# Patient Record
Sex: FEMALE | Race: WHITE | NOT HISPANIC OR LATINO | Employment: UNEMPLOYED | URBAN - METROPOLITAN AREA
[De-identification: names, ages, dates, MRNs, and addresses within clinical notes are randomized per-mention and may not be internally consistent; named-entity substitution may affect disease eponyms.]

---

## 2017-05-03 ENCOUNTER — HOSPITAL ENCOUNTER (INPATIENT)
Facility: HOSPITAL | Age: 27
LOS: 3 days | Discharge: HOME | End: 2017-05-06
Attending: OBSTETRICS & GYNECOLOGY | Admitting: OBSTETRICS & GYNECOLOGY
Payer: MEDICAID

## 2017-05-03 DIAGNOSIS — O09.30 ENCOUNTER FOR SUPERVISION OF HIGH RISK PREGNANCY WITH INSUFFICIENT PRENATAL CARE, ANTEPARTUM: Primary | ICD-10-CM

## 2017-05-03 DIAGNOSIS — O47.9 THREATENED LABOR AT TERM: ICD-10-CM

## 2017-05-03 PROBLEM — F19.10 HIGH RISK PREGNANCY DUE TO MATERNAL DRUG ABUSE, ANTEPARTUM: Status: ACTIVE | Noted: 2017-05-03

## 2017-05-03 PROBLEM — S72.309A FRACTURE OF SHAFT OF FEMUR: Status: ACTIVE | Noted: 2017-05-03

## 2017-05-03 PROBLEM — Z34.90 NORMAL PREGNANCY: Status: RESOLVED | Noted: 2017-03-21 | Resolved: 2017-05-03

## 2017-05-03 PROBLEM — O99.320 HIGH RISK PREGNANCY DUE TO MATERNAL DRUG ABUSE, ANTEPARTUM: Status: ACTIVE | Noted: 2017-05-03

## 2017-05-03 PROBLEM — N93.9 VAGINA BLEEDING: Status: ACTIVE | Noted: 2017-05-03

## 2017-05-03 PROBLEM — Z34.90 NORMAL PREGNANCY: Status: ACTIVE | Noted: 2017-03-21

## 2017-05-03 LAB
ABO + RH BLD: NORMAL
AMPHET+METHAMPHET UR QL: NORMAL
BARBITURATES UR QL SCN>200 NG/ML: NEGATIVE
BASOPHILS # BLD AUTO: 0.02 K/UL
BASOPHILS NFR BLD: 0.3 %
BENZODIAZ UR QL SCN>200 NG/ML: NEGATIVE
BLD GP AB SCN CELLS X3 SERPL QL: NORMAL
BZE UR QL SCN: NEGATIVE
CANNABINOIDS UR QL SCN: NORMAL
CREAT UR-MCNC: 292.4 MG/DL
DIFFERENTIAL METHOD: ABNORMAL
EOSINOPHIL # BLD AUTO: 0.2 K/UL
EOSINOPHIL NFR BLD: 2.7 %
ERYTHROCYTE [DISTWIDTH] IN BLOOD BY AUTOMATED COUNT: 15 %
HCT VFR BLD AUTO: 29.9 %
HGB BLD-MCNC: 9.7 G/DL
HIV1+2 IGG SERPL QL IA.RAPID: NEGATIVE
LYMPHOCYTES # BLD AUTO: 2.1 K/UL
LYMPHOCYTES NFR BLD: 30 %
MCH RBC QN AUTO: 25.5 PG
MCHC RBC AUTO-ENTMCNC: 32.4 %
MCV RBC AUTO: 79 FL
METHADONE UR QL SCN>300 NG/ML: NEGATIVE
MONOCYTES # BLD AUTO: 0.6 K/UL
MONOCYTES NFR BLD: 9 %
NEUTROPHILS # BLD AUTO: 4.2 K/UL
NEUTROPHILS NFR BLD: 58 %
OPIATES UR QL SCN: NEGATIVE
PCP UR QL SCN>25 NG/ML: NEGATIVE
PLATELET # BLD AUTO: 283 K/UL
PMV BLD AUTO: 10.2 FL
RBC # BLD AUTO: 3.8 M/UL
RPR SER QL: NORMAL
TOXICOLOGY INFORMATION: NORMAL
WBC # BLD AUTO: 7.14 K/UL

## 2017-05-03 PROCEDURE — 86900 BLOOD TYPING SEROLOGIC ABO: CPT

## 2017-05-03 PROCEDURE — 86762 RUBELLA ANTIBODY: CPT

## 2017-05-03 PROCEDURE — 86592 SYPHILIS TEST NON-TREP QUAL: CPT

## 2017-05-03 PROCEDURE — 80307 DRUG TEST PRSMV CHEM ANLYZR: CPT

## 2017-05-03 PROCEDURE — 36415 COLL VENOUS BLD VENIPUNCTURE: CPT

## 2017-05-03 PROCEDURE — 82570 ASSAY OF URINE CREATININE: CPT

## 2017-05-03 PROCEDURE — 86703 HIV-1/HIV-2 1 RESULT ANTBDY: CPT

## 2017-05-03 PROCEDURE — 85025 COMPLETE CBC W/AUTO DIFF WBC: CPT

## 2017-05-03 PROCEDURE — 80074 ACUTE HEPATITIS PANEL: CPT

## 2017-05-03 PROCEDURE — 25000003 PHARM REV CODE 250: Performed by: OBSTETRICS & GYNECOLOGY

## 2017-05-03 PROCEDURE — 63600175 PHARM REV CODE 636 W HCPCS: Performed by: OBSTETRICS & GYNECOLOGY

## 2017-05-03 PROCEDURE — 86901 BLOOD TYPING SEROLOGIC RH(D): CPT

## 2017-05-03 PROCEDURE — 11000001 HC ACUTE MED/SURG PRIVATE ROOM

## 2017-05-03 RX ORDER — ONDANSETRON 8 MG/1
8 TABLET, ORALLY DISINTEGRATING ORAL EVERY 8 HOURS PRN
Status: DISCONTINUED | OUTPATIENT
Start: 2017-05-03 | End: 2017-05-04

## 2017-05-03 RX ORDER — BUTORPHANOL TARTRATE 1 MG/ML
1 INJECTION INTRAMUSCULAR; INTRAVENOUS
Status: DISCONTINUED | OUTPATIENT
Start: 2017-05-04 | End: 2017-05-04

## 2017-05-03 RX ORDER — OXYTOCIN/RINGER'S LACTATE 20/1000 ML
333 PLASTIC BAG, INJECTION (ML) INTRAVENOUS
Status: DISCONTINUED | OUTPATIENT
Start: 2017-05-03 | End: 2017-05-04

## 2017-05-03 RX ORDER — SODIUM CHLORIDE, SODIUM LACTATE, POTASSIUM CHLORIDE, CALCIUM CHLORIDE 600; 310; 30; 20 MG/100ML; MG/100ML; MG/100ML; MG/100ML
INJECTION, SOLUTION INTRAVENOUS CONTINUOUS
Status: DISCONTINUED | OUTPATIENT
Start: 2017-05-03 | End: 2017-05-04

## 2017-05-03 RX ADMIN — DEXTROSE 5 MILLION UNITS: 50 INJECTION, SOLUTION INTRAVENOUS at 09:05

## 2017-05-03 RX ADMIN — SODIUM CHLORIDE, SODIUM LACTATE, POTASSIUM CHLORIDE, AND CALCIUM CHLORIDE 1000 ML: .6; .31; .03; .02 INJECTION, SOLUTION INTRAVENOUS at 09:05

## 2017-05-03 RX ADMIN — BUTORPHANOL TARTRATE 1 MG: 1 INJECTION, SOLUTION INTRAMUSCULAR; INTRAVENOUS at 10:05

## 2017-05-03 NOTE — IP AVS SNAPSHOT
[facility]  [id number] [place] [address] [address] [place] [address]           Patient Discharge Instructions   Our goal is to set you up for success. This packet includes information on your condition, medications, and your home care.  It will help you care for yourself to prevent having to return to the hospital.     Please ask your nurse if you have any questions.      There are many details to remember when preparing to leave the hospital. Here is what you will need to do:    1. Take your medicine. If you are prescribed medications, review your Medication List on the following pages. You may have new medications to  at the pharmacy and others that you'll need to stop taking. Review the instructions for how and when to take your medications. Talk with your doctor or nurses if you are unsure of what to do.     2. Go to your follow-up appointments. Specific follow-up information is listed in the following pages. Your may be contacted by a nurse or clinical provider about future appointments. Be sure we have all of the phone numbers to reach you. Please contact your provider's office if you are unable to make an appointment.     3. Watch for warning signs. Your doctor or nurse will give you detailed warning signs to watch for and when to call for assistance. These instructions may also include educational information about your condition. If you experience any of warning signs to your health, call your doctor.           [facility]  Unless otherwise directed by your provider, please   contact [facility], our nurse care line   that is available for 24/7 assistance.     [phone] (toll-free)     Registered nurses in the [facility]   provide: appointment scheduling, clinical advisement, health education, and other advisory services.                  ** Verify the list of medication(s) below is accurate and up to date. Carry this with you in case of emergency. If your  "medications have changed, please notify your healthcare provider.             Medication List      CONTINUE taking these medications        Additional Info                      citalopram 20 MG tablet   Commonly known as:  CELEXA   Refills:  0   Dose:  20 mg    Instructions:  Take 20 mg by mouth once daily.     Begin Date    AM    Noon    PM    Bedtime       diclofenac 50 MG EC tablet   Commonly known as:  VOLTAREN   Quantity:  14 tablet   Refills:  0   Dose:  50 mg    Instructions:  Take 1 tablet (50 mg total) by mouth 3 (three) times daily as needed.     Begin Date    AM    Noon    PM    Bedtime         STOP taking these medications     naproxen 375 MG tablet   Commonly known as:  NAPROSYN                  Please bring to all follow up appointments:    1. A copy of your discharge instructions.  2. All medicines you are currently taking in their original bottles.  3. Identification and insurance card.    Please arrive 15 minutes ahead of scheduled appointment time.    Please call 24 hours in advance if you must reschedule your appointment and/or time.        Your Scheduled Appointments     Jun 07, 2017 10:15 AM CDT   Post Partum with FREIDA Amaya - OB/ GYN ([facility])    [id number] [place] [id number] [phone]              Follow-up Information     Follow up In 4 weeks.    Why:  PP visit        Discharge Instructions     Future Orders    Activity as tolerated     Diet general     Questions:    Total calories:      Fat restriction, if any:      Protein restriction, if any:      Na restriction, if any:      Fluid restriction:      Additional restrictions:          Discharge Instructions       Mother Self Care:    Activity: Avoid strenuous exercise and get adequate rest.  No driving until the physician consent given.  Emotional Changes: Most women find birth to be a time of great emotional upheaval.  Sense of loss, mood swings, fatigue, anxiety, and feeling "let " "down" are common.  If feelings worsen or last more than a week, call your physician.  Breast Care/Breastfeeding: Wear a bra for comfort.  Keep nipples dry and apply your own breast milk or lanolin cream as needed for soreness.  Engorgement can be relieved with warm, moist heat before feedings.  You may also take Ibuprofen.  Breast Care/Bottle Feeding: Wear support bra 24 hours a day for one week.  Avoid stimulation to breasts.  You may use ice packs for discomfort.  Juani-Care/Vaginal Bleeding: Remember to use your juani-bottle after urinating.  Your flow will change from red, to pink, to yellow/white color over a period of 2 weeks.  Menstruation will return in 3-8 weeks, or longer if breastfeeding.  Episiotomy Vaginal Delivery: Stitches will dissolve within 10 days to 3 weeks.  Warm baths, tucks, and dermoplast will promote healing.  Avoid bubble baths or strong soaps.   Section/Tubal Ligation: Keep incision clean and dry.  Please remove steri-strips in 5-7 days.  You may shower, but avoid baths.  Sexual Activity/Pelvic Rest: No sexual activity, tampons, or douching until your physician gives you consent.  Diet: Continue to eat from the five basic food groups, including plenty of protein, fruits, vegetables, and whole grains.  Limit empty calories and high fat foods.  Drink enough fluids to satisfy thirst and add an extra 500 calories for breastfeeding.  Constipation/Hemorrhoids: Drink plenty of water.  You may take a stool softener or natural laxative (Metamucil). You may use tucks or hemorrhoid ointment and soak in a warm tub.    CALL YOUR OB DOCTOR IF ANY OF THE FOLLOWING OCCURS:  *Heavy bleeding - saturating a pad an hour or passing any large (2-3 inches in size) blood clots.  *Any pain, redness, or tenderness in lower leg.  *You cannot care for yourself or your baby.  *Any signs of infection-      - Temperature greater than 100.5 degrees F      - Foul smelling vaginal discharge and/or incisional " "drainage      - Increased episiotomy or incisional pain      - Hot, hard, red or sore area on breast      - Flu-like symptoms      - Any urgency, frequency or burning with urination      Primary Diagnosis     Your primary diagnosis was:  Normal Vaginal Delivery      Admission Information     Date & Time Provider Department CSN    5/3/2017  7:15 PM Sandhya Yepez MD [facility] [id number]      Care Providers     Provider Role Specialty Primary office phone    Sandhya Yepez MD Attending Provider Obstetrics and Gynecology [phone]      Your Vitals Were     BP Pulse Temp Resp Height Weight    136/87 82 98.2 °F (36.8 °C) (Oral) 18 5' 5" (1.651 m) 69.9 kg (154 lb)    Last Period SpO2 BMI          (LMP Unknown) 100% 25.63 kg/m2        Recent Lab Values     No lab values to display.      Allergies as of 5/6/2017        Reactions    Flexeril [Cyclobenzaprine] Other (See Comments)    Muscle spasms  Muscular twitching    Reglan [Metoclopramide] Other (See Comments)    Muscular twitching    Metoclopramide Hcl Other (See Comments)    Muscle spasms      Advance Directives     An advance directive is a document which, in the event you are no longer able to make decisions for yourself, tells your healthcare team what kind of treatment you do or do not want to receive, or who you would like to make those decisions for you.  If you do not currently have an advance directive, [facility] encourages you to create one.  For more information call:  [phone] ([phone]), [phone] ([phone]),  or log on to [email or web address].        Smoking Cessation     If you would like to quit smoking:   You may be eligible for free services if you are a [place] resident and started smoking cigarettes before September 1, 1988.  Call the Smoking Cessation Trust (SCT) toll free at (566) 715-3996 or (924) 635-9807.   Call 6-800-QUIT-NOW if you do not meet the above criteria.   Contact us via email: " [email or web address]   View our website for more information: [email or web address]        Language Assistance Services     ATTENTION: Language assistance services are available, free of charge. Please call [phone].      ATENCIÓN: Si benito packer, tiene a leon disposición servicios gratuitos de asistencia lingüística. Llame al [phone].     CHÚ Ý: N?u b?n nói Ti?ng Vi?t, có các d?ch v? h? tr? ngôn ng? mi?n phí dành cho b?n. G?i s? [phone].        [facility] Sign-Up     Activating your [facility] account is as easy as 1-2-3!     1) Visit [email or web address], select Sign Up Now, enter this activation code and your date of birth, then select Next.  [email or web address]  Expires: 6/20/2017 12:44 PM      2) Create a username and password to use when you visit [facility] in the future and select a security question in case you lose your password and select Next.    3) Enter your e-mail address and click Sign Up!    Additional Information  If you have questions, please e-mail [email or web address] or call [phone] to talk to our [facility] staff. Remember, [facility] is NOT to be used for urgent needs. For medical emergencies, dial 911.          [facility] complies with applicable Federal civil rights laws and does not discriminate on the basis of race, color, national origin, age, disability, or sex.

## 2017-05-04 ENCOUNTER — ANESTHESIA (OUTPATIENT)
Dept: OBSTETRICS AND GYNECOLOGY | Facility: HOSPITAL | Age: 27
End: 2017-05-04
Payer: MEDICAID

## 2017-05-04 ENCOUNTER — ANESTHESIA EVENT (OUTPATIENT)
Dept: OBSTETRICS AND GYNECOLOGY | Facility: HOSPITAL | Age: 27
End: 2017-05-04
Payer: MEDICAID

## 2017-05-04 PROCEDURE — 72100003 HC LABOR CARE, EA. ADDL. 8 HRS

## 2017-05-04 PROCEDURE — 63600175 PHARM REV CODE 636 W HCPCS: Performed by: OBSTETRICS & GYNECOLOGY

## 2017-05-04 PROCEDURE — 25000003 PHARM REV CODE 250: Performed by: NURSE ANESTHETIST, CERTIFIED REGISTERED

## 2017-05-04 PROCEDURE — 63600175 PHARM REV CODE 636 W HCPCS: Performed by: NURSE ANESTHETIST, CERTIFIED REGISTERED

## 2017-05-04 PROCEDURE — 25000003 PHARM REV CODE 250: Performed by: ADVANCED PRACTICE MIDWIFE

## 2017-05-04 PROCEDURE — 27800517 HC TRAY,EPIDURAL-CONTINUOUS: Performed by: NURSE ANESTHETIST, CERTIFIED REGISTERED

## 2017-05-04 PROCEDURE — 59409 OBSTETRICAL CARE: CPT | Mod: AT,,, | Performed by: ADVANCED PRACTICE MIDWIFE

## 2017-05-04 PROCEDURE — 63600175 PHARM REV CODE 636 W HCPCS: Performed by: ADVANCED PRACTICE MIDWIFE

## 2017-05-04 PROCEDURE — 72100002 HC LABOR CARE, 1ST 8 HOURS

## 2017-05-04 PROCEDURE — 51701 INSERT BLADDER CATHETER: CPT

## 2017-05-04 PROCEDURE — 59025 FETAL NON-STRESS TEST: CPT

## 2017-05-04 PROCEDURE — 72200005 HC VAGINAL DELIVERY LEVEL II

## 2017-05-04 PROCEDURE — 11000001 HC ACUTE MED/SURG PRIVATE ROOM

## 2017-05-04 PROCEDURE — 25000003 PHARM REV CODE 250: Performed by: OBSTETRICS & GYNECOLOGY

## 2017-05-04 PROCEDURE — 99211 OFF/OP EST MAY X REQ PHY/QHP: CPT | Mod: 25

## 2017-05-04 PROCEDURE — 62326 NJX INTERLAMINAR LMBR/SAC: CPT | Performed by: ANESTHESIOLOGY

## 2017-05-04 RX ORDER — AMMONIA 15 % (W/V)
0.3 AMPUL (EA) INHALATION CONTINUOUS PRN
Status: DISCONTINUED | OUTPATIENT
Start: 2017-05-04 | End: 2017-05-06 | Stop reason: HOSPADM

## 2017-05-04 RX ORDER — ACETAMINOPHEN 325 MG/1
650 TABLET ORAL EVERY 6 HOURS PRN
Status: DISCONTINUED | OUTPATIENT
Start: 2017-05-04 | End: 2017-05-06 | Stop reason: HOSPADM

## 2017-05-04 RX ORDER — HYDROCORTISONE 25 MG/G
CREAM TOPICAL 3 TIMES DAILY PRN
Status: DISCONTINUED | OUTPATIENT
Start: 2017-05-04 | End: 2017-05-06 | Stop reason: HOSPADM

## 2017-05-04 RX ORDER — OXYTOCIN/RINGER'S LACTATE 20/1000 ML
2 PLASTIC BAG, INJECTION (ML) INTRAVENOUS CONTINUOUS
Status: DISCONTINUED | OUTPATIENT
Start: 2017-05-04 | End: 2017-05-04

## 2017-05-04 RX ORDER — DOCUSATE SODIUM 100 MG/1
100 CAPSULE, LIQUID FILLED ORAL DAILY
Status: DISCONTINUED | OUTPATIENT
Start: 2017-05-04 | End: 2017-05-06 | Stop reason: HOSPADM

## 2017-05-04 RX ORDER — ROPIVACAINE HYDROCHLORIDE 2 MG/ML
INJECTION, SOLUTION EPIDURAL; INFILTRATION; PERINEURAL
Status: DISCONTINUED | OUTPATIENT
Start: 2017-05-04 | End: 2017-05-04

## 2017-05-04 RX ORDER — FENTANYL CITRATE 50 UG/ML
100 INJECTION, SOLUTION INTRAMUSCULAR; INTRAVENOUS ONCE
Status: COMPLETED | OUTPATIENT
Start: 2017-05-04 | End: 2017-05-04

## 2017-05-04 RX ORDER — LIDOCAINE HYDROCHLORIDE AND EPINEPHRINE 15; 5 MG/ML; UG/ML
INJECTION, SOLUTION EPIDURAL
Status: DISCONTINUED | OUTPATIENT
Start: 2017-05-04 | End: 2017-05-04

## 2017-05-04 RX ORDER — ROPIVACAINE HYDROCHLORIDE 2 MG/ML
INJECTION, SOLUTION EPIDURAL; INFILTRATION; PERINEURAL CONTINUOUS PRN
Status: DISCONTINUED | OUTPATIENT
Start: 2017-05-04 | End: 2017-05-04

## 2017-05-04 RX ORDER — OXYCODONE AND ACETAMINOPHEN 10; 325 MG/1; MG/1
1 TABLET ORAL EVERY 4 HOURS PRN
Status: DISCONTINUED | OUTPATIENT
Start: 2017-05-04 | End: 2017-05-06 | Stop reason: HOSPADM

## 2017-05-04 RX ORDER — OXYCODONE AND ACETAMINOPHEN 5; 325 MG/1; MG/1
1 TABLET ORAL EVERY 4 HOURS PRN
Status: DISCONTINUED | OUTPATIENT
Start: 2017-05-04 | End: 2017-05-06 | Stop reason: HOSPADM

## 2017-05-04 RX ORDER — DIPHENHYDRAMINE HCL 25 MG
25 CAPSULE ORAL EVERY 4 HOURS PRN
Status: DISCONTINUED | OUTPATIENT
Start: 2017-05-04 | End: 2017-05-06 | Stop reason: HOSPADM

## 2017-05-04 RX ORDER — IBUPROFEN 600 MG/1
600 TABLET ORAL EVERY 6 HOURS PRN
Status: DISCONTINUED | OUTPATIENT
Start: 2017-05-04 | End: 2017-05-06 | Stop reason: HOSPADM

## 2017-05-04 RX ORDER — ONDANSETRON 8 MG/1
8 TABLET, ORALLY DISINTEGRATING ORAL EVERY 8 HOURS PRN
Status: DISCONTINUED | OUTPATIENT
Start: 2017-05-04 | End: 2017-05-06 | Stop reason: HOSPADM

## 2017-05-04 RX ADMIN — IBUPROFEN 600 MG: 600 TABLET, FILM COATED ORAL at 11:05

## 2017-05-04 RX ADMIN — LIDOCAINE HYDROCHLORIDE AND EPINEPHRINE 3 ML: 15; 5 INJECTION, SOLUTION EPIDURAL; INFILTRATION; INTRACAUDAL; PERINEURAL at 07:05

## 2017-05-04 RX ADMIN — OXYCODONE HYDROCHLORIDE AND ACETAMINOPHEN 1 TABLET: 10; 325 TABLET ORAL at 07:05

## 2017-05-04 RX ADMIN — FENTANYL CITRATE 100 MCG: 50 INJECTION INTRAMUSCULAR; INTRAVENOUS at 06:05

## 2017-05-04 RX ADMIN — IBUPROFEN 600 MG: 600 TABLET, FILM COATED ORAL at 05:05

## 2017-05-04 RX ADMIN — ROPIVACAINE HYDROCHLORIDE 11 ML: 2 INJECTION, SOLUTION EPIDURAL; INFILTRATION at 07:05

## 2017-05-04 RX ADMIN — BUTORPHANOL TARTRATE 1 MG: 1 INJECTION, SOLUTION INTRAMUSCULAR; INTRAVENOUS at 03:05

## 2017-05-04 RX ADMIN — ROPIVACAINE HYDROCHLORIDE 12 ML/HR: 2 INJECTION, SOLUTION EPIDURAL; INFILTRATION at 07:05

## 2017-05-04 RX ADMIN — DEXTROSE 2.5 MILLION UNITS: 50 INJECTION, SOLUTION INTRAVENOUS at 01:05

## 2017-05-04 RX ADMIN — DEXTROSE 2.5 MILLION UNITS: 50 INJECTION, SOLUTION INTRAVENOUS at 05:05

## 2017-05-04 RX ADMIN — SODIUM CHLORIDE, SODIUM LACTATE, POTASSIUM CHLORIDE, AND CALCIUM CHLORIDE: .6; .31; .03; .02 INJECTION, SOLUTION INTRAVENOUS at 07:05

## 2017-05-04 RX ADMIN — SODIUM CHLORIDE, SODIUM LACTATE, POTASSIUM CHLORIDE, AND CALCIUM CHLORIDE: .6; .31; .03; .02 INJECTION, SOLUTION INTRAVENOUS at 01:05

## 2017-05-04 RX ADMIN — SODIUM CHLORIDE, SODIUM LACTATE, POTASSIUM CHLORIDE, AND CALCIUM CHLORIDE: .6; .31; .03; .02 INJECTION, SOLUTION INTRAVENOUS at 05:05

## 2017-05-04 RX ADMIN — Medication 2 MILLI-UNITS/MIN: at 01:05

## 2017-05-04 RX ADMIN — OXYCODONE HYDROCHLORIDE AND ACETAMINOPHEN 1 TABLET: 5; 325 TABLET ORAL at 03:05

## 2017-05-04 NOTE — ANESTHESIA RELEASE NOTE
"Anesthesia Release from PACU Note    Patient: Georgette Adwoa Jenaro    Procedure(s) Performed: epidural    Anesthesia type: epidural    Post pain: Adequate analgesia    Post assessment: no apparent anesthetic complications    Last Vitals:   Visit Vitals    /77    Pulse 60    Temp 36.7 °C (98.1 °F) (Oral)    Resp 18    Ht 5' 5" (1.651 m)    Wt 69.9 kg (154 lb)    LMP  (LMP Unknown)    SpO2 100%    Breastfeeding Unknown    BMI 25.63 kg/m2       Post vital signs: stable    Level of consciousness: awake and alert     Nausea/Vomiting: no nausea/no vomiting    Complications: none    Airway Patency: patent    Respiratory: unassisted, spontaneous ventilation, room air    Cardiovascular: stable and blood pressure at baseline    Hydration: euvolemic  "

## 2017-05-04 NOTE — ASSESSMENT & PLAN NOTE
No vaginal bleeding at present.  Monitor closely for onset of bleeding or s/s abruption, NRFHR.  Will notify MD immediately of same.
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
## 2017-05-04 NOTE — ANESTHESIA PREPROCEDURE EVALUATION
05/04/2017  Georgette Eason is a 26 y.o., female.    Anesthesia Evaluation    I have reviewed the Patient Summary Reports.    I have reviewed the Nursing Notes.   I have reviewed the Medications.     Review of Systems  Anesthesia Hx:  No problems with previous Anesthesia  History of prior surgery of interest to airway management or planning: Previous anesthesia: General, Epidural Denies Family Hx of Anesthesia complications.   Denies Personal Hx of Anesthesia complications.   Social:  Smoker thc and methampetamine use   Hematology/Oncology:  Hematology Normal   Oncology Normal     EENT/Dental:EENT/Dental Normal   Cardiovascular:  Cardiovascular Normal     Pulmonary:  Pulmonary Normal    Hepatic/GI:   Liver Disease, Recently diagnosed with hep c   Musculoskeletal:  Musculoskeletal Normal    Neurological:  Neurology Normal    Endocrine:  Endocrine Normal    Dermatological:  Skin Normal    Psych:   Psychiatric History          Physical Exam  General:  Well nourished    Airway/Jaw/Neck:  Airway Findings: Mouth Opening: Normal Tongue: Normal  General Airway Assessment: Adult  Mallampati: III  Improves to II with phonation.  TM Distance: Normal, at least 6 cm  Jaw/Neck Findings:  Neck ROM: Normal ROM      Dental:  Dental Findings: Periodontal disease, Mild, In tact        Mental Status:  Mental Status Findings:  Alert and Oriented         Anesthesia Plan  Type of Anesthesia, risks & benefits discussed:  Anesthesia Type:  epidural  Patient's Preference:   Intra-op Monitoring Plan:   Intra-op Monitoring Plan Comments:   Post Op Pain Control Plan:   Post Op Pain Control Plan Comments:   Induction:    Beta Blocker:  Patient is not currently on a Beta-Blocker (No further documentation required).       Informed Consent: Patient understands risks and agrees with Anesthesia plan.  Questions answered. Anesthesia  consent signed with patient.  ASA Score: 3     Day of Surgery Review of History & Physical: I have interviewed and examined the patient. I have reviewed the patient's H&P dated: 5/4/17. There are no significant changes.  H&P update referred to the provider.     Anesthesia Plan Notes: Fentanyl 100 mcg iv at 0630        Ready For Surgery From Anesthesia Perspective.

## 2017-05-04 NOTE — PROGRESS NOTES
[facility]  Obstetrics  Labor Progress Note    Patient Name: Georgette Eason  MRN: [id number]  Admission Date: 5/3/2017  Hospital Length of Stay: 1 days  Attending Physician: Sandhya Yepez MD  Primary Care Provider: Ramona Fermin MD    Subjective:     Principal Problem:<principal problem not specified>    Hospital Course:  Patient with no records on file.  Appears to be in discomfort and placed in triage for immediate assessment, VS, labs and evaluation.   Dr. Yepez called w/ results of FH of 39 cm, vtx presentation by Leopolds and VE, EFW 6 -7#,  VE 3.5 cm, Cat 2 tracing (no accels, but neg decels with contractions noted q 3-4), h/o substance use and blood clot visualized of 5 cm size (pt brought in).   Recommended admission and commit to delivery with initial expectant management, CFM and evaluation for cervical change or increased bleeding.   Routine ob with additional labs for no PNC, do UDS and hepatitis panel.       5/3/2017 - 9:15 pm. Records obtained from [facility].   Patient requested that her status be changed to organ donor.  States 's license states no organ donation, but has been wanting to change for 8 months and wants it known she would like to be an organ donor.     2017 - 1 am  Augmentation indicated.   17 8am AROM moderate meconium        Interval History:  Georgette Orona is a 26 y.o.  at 38.3 by 36 week sono. She is doing well. Adequate progress in labor Meconium stained fluid epidural placed for pain control    Objective:     Vital Signs (Most Recent):  Temp: 97.6 °F (36.4 °C) (17)  Pulse: 62 (17 06)  Resp: 20 (17)  BP: (!) 135/100 (17) Vital Signs (24h Range):  Temp:  [97.6 °F (36.4 °C)-98.1 °F (36.7 °C)] 97.6 °F (36.4 °C)  Pulse:  [] 62  Resp:  [18-20] 20  BP: (123-145)/() 135/100     Weight: 69.9 kg (154 lb)  Body mass index is 25.63 kg/(m^2).    FHT: 150Cat 1  (reassuring)  TOCO:  Q 60 minutes    Cervical Exam:  Dilation:  6  Effacement:  75%  Station: -2  Presentation: Vertex     Significant Labs:  Lab Results   Component Value Date    GROUPTRH O POS 2017       I have personallly reviewed all pertinent lab results from the last 24 hours.    Physical Exam:   Constitutional: She is oriented to person, place, and time. She appears well-developed and well-nourished.    HENT:   Head: Normocephalic.    Eyes: Conjunctivae are normal. Pupils are equal, round, and reactive to light.    Neck: Normal range of motion.    Cardiovascular: Normal rate and regular rhythm.     Pulmonary/Chest: Effort normal and breath sounds normal.        Abdominal: Soft.     Genitourinary: Vagina normal and uterus normal.           Musculoskeletal: Normal range of motion and moves all extremeties.       Neurological: She is alert and oriented to person, place, and time.    Skin: Skin is warm and dry.    Psychiatric: She has a normal mood and affect. Her behavior is normal. Thought content normal.       Assessment/Plan:     26 y.o. female  at Unknown for:    High risk pregnancy due to maternal drug abuse, antepartum  Epidural placed for comfort  AROM moderate meconium  CX6/90/-2  P continue to monitor and assess        June Cunningham CNM  Obstetrics  [facility]

## 2017-05-04 NOTE — TRANSFER OF CARE
"Anesthesia Transfer of Care Note    Patient: Georgette Adwoa Jenaro    Procedure(s) Performed: epidural    Patient location: Labor and Delivery    Anesthesia Type: epidural    Post pain: adequate analgesia    Post assessment: no apparent anesthetic complications    Post vital signs: stable    Level of consciousness: awake and alert    Nausea/Vomiting: no nausea/vomiting    Complications: none          Last vitals:   Visit Vitals    /77    Pulse 60    Temp 36.7 °C (98.1 °F) (Oral)    Resp 18    Ht 5' 5" (1.651 m)    Wt 69.9 kg (154 lb)    LMP  (LMP Unknown)    SpO2 100%    Breastfeeding Unknown    BMI 25.63 kg/m2     "

## 2017-05-04 NOTE — PLAN OF CARE
"Problem: Patient Care Overview  Goal: Plan of Care Review  Outcome: Ongoing (interventions implemented as appropriate)  Patient admitted for labor. Patient desires natural child birth. Patient had 2 PNC visits at [facility]. Patient admits to amphetamines and THC "a few days ago". Patient and baby tolerating labor well. Family at bedside with patient.       "

## 2017-05-04 NOTE — ANESTHESIA PROCEDURE NOTES
Epidural    Patient location during procedure: OB   Reason for block: primary anesthetic   Diagnosis: iup   Start time: 5/4/2017 7:12 AM  Timeout: 5/4/2017 7:11 AM  End time: 5/4/2017 7:34 AM  Surgery related to: labor  Staffing  Anesthesiologist: CUBA TIAN  Performed by: anesthesiologist   Preanesthetic Checklist  Completed: patient identified, surgical consent, pre-op evaluation, timeout performed, IV checked, risks and benefits discussed, monitors and equipment checked, anesthesia consent given, hand hygiene performed and patient being monitored  Preparation  Patient position: sitting  Prep: Betadine  Patient monitoring: Pulse Ox and Blood Pressure  Epidural  Skin Anesthetic: lidocaine 1%  Administration type: continuous  Approach: midline  Interspace: L3-4  Injection technique: ERIKA air  Needle and Epidural Catheter  Needle type: Tuohy   Needle gauge: 18  Needle length: 3.5 inches  Needle insertion depth: 7 cm  Catheter type: multi-orifice  Catheter size: 20 G  Catheter at skin depth: 13 cm  Test dose: 3 mL of lidocaine 1.5% with Epi 1-to-200,000  Additional Documentation: incremental injection, negative aspiration for heme and CSF, no signs/symptoms of IV or SA injection, no paresthesia on injection, no significant pain on injection and no significant complaints from patient  Needle localization: anatomical landmarks  Medications:  Bolus administered: 11 mL of 0.2% ropivacaine  Volume per aspiration: 4 mL  Time between aspirations: 0.3 minutes  Assessment  Upper dermatomal levels - Left: T10  Right: T10   Dermatomal levels determined by alcohol wipe  Ease of block: easy  Patient's tolerance of the procedure: no complaints and comfortable throughout block  Post dural Puncture Headache?: Yes  Additional Notes  Blood aspirated in catheter that did not clear with saline. Catheter removed and epidural repeated

## 2017-05-04 NOTE — L&D DELIVERY NOTE
Delivery Information for  Magen Eason    Birth information:  YOB: 2016   Time of birth: 9:13 AM   Sex: male   Head Delivery Date/Time: 2017  9:03 AM   Delivery type: Vaginal, Spontaneous Delivery   Gestational Age: <None>    Delivery Providers    Delivering clinician:  MALISSA COREA   Other personnel:   Provider Role   SHAVON CONDON Registered Nurse   GAYATRI LION Registered Nurse   JOSE HERNANDEZ Surgical Tech   ROSIBEL TOMPKINS Registered Nurse                  Dexter Measurements    Weight:  3210 g Length:  49.5 cm   Head circum.:  34 cm Chest circum.:  32 cm   Abdominal girth:  33 cm           Assessment    Living status:  Yes   Apgars    1 Minute:   5 Minute:   10 Minute 15 Minute 20 Minute   Skin Color: 0  1       Heart Rate: 1  2       Reflex Irritability: 1  2       Muscle Tone: 0  2       Respiratory Effort: 0  2       Total: 2  9                  Apgars Assigned By:  BALTA BROCK          Assisted Delivery Details:    Forceps attempted?:  No   Vacuum extractor attempted?:  No             Shoulder Dystocia    Shoulder dystocia present?:  No                                             Presentation and Position    Presentation: Vertex   Position:     Occiput    Anterior               Interventions/Resuscitation    Method:  Bulb Suctioning, Tactile Stimulation, Deep Suctioning, PPV, NICU Attended        Cord    Complications:  Nuchal   Nuchal Intervention:  clamped and cut   Nuchal Cord Description:  tight nuchal cord   Number of Loops:  1   Delayed Cord Clamping?:  No   Cord Clamped Date/Time:  2017  9:14 AM   Cord Blood Disposition:  Lab   Gases Sent?:  No   Stem Cell Collection (by MD):  No        Placenta    Date and time:  2017  9:18 AM   Removal:  Spontaneous   Appearance:  Intact   Placenta disposition:  Discarded            Labor Events:       labor: No     Labor Onset Date/Time: 2017 07:00     Dilation Complete Date/Time:  2017 08:53     Start Pushing Date/Time: 2017 09:06     Rupture Date/Time:              Rupture type:           Fluid Amount:        Fluid Color:        Fluid Odor:        Membrane Status (PeriCalm): ARM (Artificial Rupture)      Rupture Date/Time (PeriCalm): 2017 07:58:00      Fluid Amount (PeriCalm): Moderate      Fluid Color (PeriCalm): Meconium Moderate       steroids: None     Antibiotics given for GBS: Yes     Induction: none     Indications for induction:        Augmentation: oxytocin     Indications for augmentation: Ineffective Contraction Pattern     Labor complications: None     Additional complications:          Cervical ripening:                     Delivery:      Episiotomy: None     Indication for Episiotomy:       Perineal Lacerations: None Repaired:      Periurethral Laceration: none Repaired:     Labial Laceration: none Repaired:     Sulcus Laceration: none Repaired:     Vaginal Laceration: No Repaired:     Cervical Laceration: No Repaired:     Repair suture:       Repair # of packets:       Blood loss (ml): 300     Vaginal Sweep Performed:       Surgicount Correct:         Other providers:       Anesthesia    Method:  Epidural              Details (if applicable):  Trial of Labor      Categorization:      Priority:     Indications for :     Incision Type:       Additional  information:  Forceps:    Vacuum:    Breech:    Observed anomalies    Other (Comments):          of male infant in OA position over intact perineum  Infant had tight nucal cord, clamped and cut reduced on perineum  Shoulders delivered with ease  Infant to [facility] for meconium management  Placenta delivered with gentle traction  Inspection reveals no lacerations  EBL 300cc

## 2017-05-04 NOTE — ASSESSMENT & PLAN NOTE
Patient currently admitted to hospital for expectant management of early labor.   UDS pending.     UDS + amphetamines, THC.  Contractions ongoing but slightly irregular. Pitocin augmentation was started for same and currently @ 4mu/min.  FHT's reassuriing.  No evidence of abruption/tachysytole or vaginal bleeding.  Increase pitocin per guidelines.   May have Stadol IV as ordered for analgesia or epidural if desires.
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
## 2017-05-04 NOTE — PROGRESS NOTES
[facility]  Obstetrics  Labor Progress Note    Patient Name: Georgette Eason  MRN: [id number]  Admission Date: 5/3/2017  Hospital Length of Stay: 1 days  Attending Physician: Sandhya Yepez MD  Primary Care Provider: Ramona Fermin MD    Subjective:     Principal Problem:<principal problem not specified>    Hospital Course:  Patient with no records on file.  Appears to be in discomfort and placed in triage for immediate assessment, VS, labs and evaluation.   Dr. Yepez called w/ results of FH of 39 cm, vtx presentation by Leopolds and VE, EFW 6 -7#,  VE 3.5 cm, Cat 2 tracing (no accels, but neg decels with contractions noted q 3-4), h/o substance use and blood clot visualized of 5 cm size (pt brought in).   Recommended admission and commit to delivery with initial expectant management, CFM and evaluation for cervical change or increased bleeding.   Routine ob with additional labs for no PNC, do UDS and hepatitis panel.       5/3/2017 - 9:15 pm. Records obtained from [facility].   Patient requested that her status be changed to organ donor.  States 's license states no organ donation, but has been wanting to change for 8 months and wants it known she would like to be an organ donor.     2017 - 1 am  Augmentation indicated.         Interval History:  Georgette Orona is a 26 y.o.  at 38.4 wks She was admitted for onset of early labor management.       Objective:     Vital Signs (Most Recent):  Temp: 98.1 °F (36.7 °C) (17 2145)  Pulse: 83 (17 0119)  Resp: 18 (17 0059)  BP: 123/84 (17 0119) Vital Signs (24h Range):  Temp:  [98.1 °F (36.7 °C)] 98.1 °F (36.7 °C)  Pulse:  [] 83  Resp:  [18-20] 18  BP: (123-145)/(77-93) 123/84     Weight: 69.9 kg (154 lb)  Body mass index is 25.63 kg/(m^2).       UDS was + for amphetamines, thc    FHT: Cat 1 (reassuring)  TOCO:  Q 3-6 minutes    Cervical Exam:  Dilation:  3  Effacement:   75%  Station: -2  Presentation: Vertex     Significant Labs:  Lab Results   Component Value Date    GROUPTRH O POS 2017       I have personallly reviewed all pertinent lab results from the last 24 hours.    Physical Exam    Assessment/Plan:     26 y.o. female  at Unknown for:    High risk pregnancy due to maternal drug abuse, antepartum  Patient currently admitted to hospital for expectant management of early labor.   UDS pending.     UDS + amphetamines, THC.  Contractions ongoing but slightly irregular. Pitocin augmentation was started for same and currently @ 4mu/min.  FHT's reassuriing.  No evidence of abruption/tachysytole or vaginal bleeding.  Increase pitocin per guidelines.   May have Stadol IV as ordered for analgesia or epidural if desires.    Vagina bleeding  No vaginal bleeding at present.  Monitor closely for onset of bleeding or s/s abruption, NRFHR.  Will notify MD immediately of same.         Lynda Davison CNM  Obstetrics  [facility]

## 2017-05-04 NOTE — ANESTHESIA POSTPROCEDURE EVALUATION
"Anesthesia Post Evaluation    Patient: Georgette Eason    Procedure(s) Performed: epidural    Final Anesthesia Type: epidural  Patient location during evaluation: labor & delivery  Patient participation: Yes- Able to Participate  Level of consciousness: awake and alert  Post-procedure vital signs: reviewed and stable  Pain management: adequate  Airway patency: patent  PONV status at discharge: No PONV  Anesthetic complications: no      Cardiovascular status: blood pressure returned to baseline  Respiratory status: spontaneous ventilation, unassisted and room air  Hydration status: euvolemic  Follow-up not needed.        Visit Vitals    /77    Pulse 60    Temp 36.7 °C (98.1 °F) (Oral)    Resp 18    Ht 5' 5" (1.651 m)    Wt 69.9 kg (154 lb)    LMP  (LMP Unknown)    SpO2 100%    Breastfeeding Unknown    BMI 25.63 kg/m2       Pain/Wellington Score: Pain Rating Prior to Med Admin: 6 (5/4/2017 11:20 AM)  Pain Rating Post Med Admin: 4 (5/4/2017  4:30 AM)  Wellington Score: 10 (5/4/2017 10:32 AM)      "

## 2017-05-04 NOTE — SUBJECTIVE & OBJECTIVE
Interval History:  Georgette Orona is a 26 y.o.  at 38.4 wks She was admitted for onset of early labor management.       Objective:     Vital Signs (Most Recent):  Temp: 98.1 °F (36.7 °C) (17 2145)  Pulse: 83 (17 0119)  Resp: 18 (17 0059)  BP: 123/84 (17 0119) Vital Signs (24h Range):  Temp:  [98.1 °F (36.7 °C)] 98.1 °F (36.7 °C)  Pulse:  [] 83  Resp:  [18-20] 18  BP: (123-145)/(77-93) 123/84     Weight: 69.9 kg (154 lb)  Body mass index is 25.63 kg/(m^2).       UDS was + for amphetamines, thc    FHT: Cat 1 (reassuring)  TOCO:  Q 3-6 minutes    Cervical Exam:  Dilation:  3  Effacement:  75%  Station: -2  Presentation: Vertex     Significant Labs:  Lab Results   Component Value Date    GROUPTRH O POS 2017       I have personallly reviewed all pertinent lab results from the last 24 hours.    Physical Exam

## 2017-05-04 NOTE — SUBJECTIVE & OBJECTIVE
Interval History:  Georgette Orona is a 26 y.o.  at 38.3 by 36 week sono. She is doing well. Adequate progress in labor Meconium stained fluid epidural placed for pain control    Objective:     Vital Signs (Most Recent):  Temp: 97.6 °F (36.4 °C) (17)  Pulse: 62 (17)  Resp: 20 (17)  BP: (!) 135/100 (17) Vital Signs (24h Range):  Temp:  [97.6 °F (36.4 °C)-98.1 °F (36.7 °C)] 97.6 °F (36.4 °C)  Pulse:  [] 62  Resp:  [18-20] 20  BP: (123-145)/() 135/100     Weight: 69.9 kg (154 lb)  Body mass index is 25.63 kg/(m^2).    FHT: 150Cat 1 (reassuring)  TOCO:  Q 60 minutes    Cervical Exam:  Dilation:  6  Effacement:  75%  Station: -2  Presentation: Vertex     Significant Labs:  Lab Results   Component Value Date    GROUPTRH O POS 2017       I have personallly reviewed all pertinent lab results from the last 24 hours.    Physical Exam:   Constitutional: She is oriented to person, place, and time. She appears well-developed and well-nourished.    HENT:   Head: Normocephalic.    Eyes: Conjunctivae are normal. Pupils are equal, round, and reactive to light.    Neck: Normal range of motion.    Cardiovascular: Normal rate and regular rhythm.     Pulmonary/Chest: Effort normal and breath sounds normal.        Abdominal: Soft.     Genitourinary: Vagina normal and uterus normal.           Musculoskeletal: Normal range of motion and moves all extremeties.       Neurological: She is alert and oriented to person, place, and time.    Skin: Skin is warm and dry.    Psychiatric: She has a normal mood and affect. Her behavior is normal. Thought content normal.

## 2017-05-05 PROBLEM — N93.9 VAGINA BLEEDING: Status: RESOLVED | Noted: 2017-05-03 | Resolved: 2017-05-05

## 2017-05-05 LAB
HAV IGM SERPL QL IA: NEGATIVE
HBV CORE IGM SERPL QL IA: NEGATIVE
HBV SURFACE AG SERPL QL IA: NEGATIVE
HBV SURFACE AG SERPL QL IA: NEGATIVE
HCV AB SERPL QL IA: POSITIVE
RUBV IGG SER-ACNC: 17.3 IU/ML
RUBV IGG SER-IMP: REACTIVE

## 2017-05-05 PROCEDURE — 11000001 HC ACUTE MED/SURG PRIVATE ROOM

## 2017-05-05 PROCEDURE — 25000003 PHARM REV CODE 250: Performed by: ADVANCED PRACTICE MIDWIFE

## 2017-05-05 RX ADMIN — OXYCODONE HYDROCHLORIDE AND ACETAMINOPHEN 1 TABLET: 5; 325 TABLET ORAL at 03:05

## 2017-05-05 RX ADMIN — IBUPROFEN 600 MG: 600 TABLET, FILM COATED ORAL at 05:05

## 2017-05-05 RX ADMIN — OXYCODONE HYDROCHLORIDE AND ACETAMINOPHEN 1 TABLET: 5; 325 TABLET ORAL at 09:05

## 2017-05-05 RX ADMIN — OXYCODONE HYDROCHLORIDE AND ACETAMINOPHEN 1 TABLET: 10; 325 TABLET ORAL at 01:05

## 2017-05-05 RX ADMIN — DOCUSATE SODIUM 100 MG: 100 CAPSULE, LIQUID FILLED ORAL at 09:05

## 2017-05-05 RX ADMIN — IBUPROFEN 600 MG: 600 TABLET, FILM COATED ORAL at 11:05

## 2017-05-05 RX ADMIN — IBUPROFEN 600 MG: 600 TABLET, FILM COATED ORAL at 12:05

## 2017-05-05 RX ADMIN — OXYCODONE HYDROCHLORIDE AND ACETAMINOPHEN 1 TABLET: 10; 325 TABLET ORAL at 04:05

## 2017-05-05 NOTE — SUBJECTIVE & OBJECTIVE
Interval History:  Georgette Eason is a 26 y.o. female status post Spontaneous vaginal delivery on 5/4/17 09:13 AM  at Unknown. Patient is doing well today. She denies nausea, vomiting, fever or chills.  Patient reports mild abdominal pain that is well relieved by narcotic oral pain medications. Vaginal bleeding is light. Patient is voiding without difficulty and ambulating with no difficulty.    Patient is not breastfeeding. She desires circumcision for her male baby: yes.     Objective:     Vital Signs (Most Recent):  Temp: 98.5 °F (36.9 °C) (05/05/17 0800)  Pulse: 69 (05/05/17 0800)  Resp: 16 (05/05/17 0800)  BP: (!) 136/96 (05/05/17 0800)  SpO2: 100 % (05/04/17 1028) Vital Signs (24h Range):  Temp:  [97.6 °F (36.4 °C)-98.5 °F (36.9 °C)] 98.5 °F (36.9 °C)  Pulse:  [] 69  Resp:  [16-20] 16  SpO2:  [100 %] 100 %  BP: (122-152)/() 136/96     Weight: 69.9 kg (154 lb)  Body mass index is 25.63 kg/(m^2).      Intake/Output Summary (Last 24 hours) at 05/05/17 0948  Last data filed at 05/04/17 1600   Gross per 24 hour   Intake             1000 ml   Output              950 ml   Net               50 ml       Significant Labs:  Lab Results   Component Value Date    GROUPTRH O POS 05/03/2017       Recent Labs  Lab 05/03/17 1956   HGB 9.7*   HCT 29.9*       I have personallly reviewed all pertinent lab results from the last 24 hours.    Physical Exam:   Constitutional: She is oriented to person, place, and time. She appears well-developed and well-nourished. No distress.    HENT:   Head: Normocephalic and atraumatic.    Eyes: Right eye exhibits no discharge. Left eye exhibits no discharge.    Neck: Normal range of motion. Neck supple. No tracheal deviation present. No thyromegaly present.     Pulmonary/Chest: Effort normal. No respiratory distress.        Abdominal:   Fundus firm     Genitourinary:   Genitourinary Comments: Lochia rubra light to moderate           Musculoskeletal: Normal range of motion and  moves all extremeties. She exhibits no tenderness.       Neurological: She is alert and oriented to person, place, and time. She exhibits normal muscle tone. Coordination normal.    Skin: Skin is warm and dry. No rash noted. She is not diaphoretic. No erythema. No pallor.    Psychiatric: She has a normal mood and affect. Her behavior is normal. Judgment and thought content normal.

## 2017-05-05 NOTE — PROGRESS NOTES
Spoke to RN after visiting with pt. Multiple family members have been present with mother and baby throughout their stay so far. RN reports that family members have been caring for infant for the most part, but RN and MSW observed mother caring for infant at different times.  Mother's family seems to be very supportive.     Called DCFS ([phone]) to see if this case has been assigned yet. MSW was told the case has been assigned, but the  was in field at this time. They said they would send a message to the  to give MSW a call.

## 2017-05-05 NOTE — PLAN OF CARE
Met with pt per consult. Pt's UDS was +THC and amphetamine. Baby's urine +Amphetamine and meconium is pending.   Explained mandated reporting to DCFS. Pt has history with DCFS and expressed understanding. Pt's 6 and 8yo daughters were placed in foster care by DCFS, and pt states she has not had contact with them and does not know where they are living.  Pt lives with her mother who is supportive. Address and contact information confirmed by pt. FOB, Jeramy Mcnally, is asleep in bed next to pt. Pt's sisters have been present and assisting with caring for baby. Pt states they have all essential items for baby, and pediatrician will be Dr. Kevin Patel.     [facility] and written [facility]u was faxed to [facility] ([phone]). Intake #[id number].    MSW will follow for meconium results and also send those to DCFS when available. Awaiting DCFS worker to visit mother today to determine discharge plan.

## 2017-05-05 NOTE — PLAN OF CARE
Problem: Patient Care Overview  Goal: Plan of Care Review  Outcome: Ongoing (interventions implemented as appropriate)  Pt progressing, bonding well with baby boy. VSS. Ambulating independently. Pain controlled with oral medications. No issues noted. Will continue to monitor progress.

## 2017-05-05 NOTE — PROGRESS NOTES
[facility] -   Obstetrics  Postpartum Progress Note    Patient Name: Georgette Eason  MRN: [id number]  Admission Date: 5/3/2017  Hospital Length of Stay: 2 days  Attending Physician: Sandhya Yepez MD  Primary Care Provider: Ramona Fermin MD    Subjective:     Principal Problem: (normal spontaneous vaginal delivery)    Hospital Course:  Patient with no records on file.  Appears to be in discomfort and placed in triage for immediate assessment, VS, labs and evaluation.   Dr. Yepez called w/ results of FH of 39 cm, vtx presentation by Leopolds and VE, EFW 6 -7#,  VE 3.5 cm, Cat 2 tracing (no accels, but neg decels with contractions noted q 3-4), h/o substance use and blood clot visualized of 5 cm size (pt brought in).   Recommended admission and commit to delivery with initial expectant management, CFM and evaluation for cervical change or increased bleeding.   Routine ob with additional labs for no PNC, do UDS and hepatitis panel.       5/3/2017 - 9:15 pm. Records obtained from [facility].   Patient requested that her status be changed to organ donor.  States 's license states no organ donation, but has been wanting to change for 8 months and wants it known she would like to be an organ donor.     2017 - 1 am  Augmentation indicated.   17 8am AROM moderate meconium    17         Interval History:  Georgette Eason is a 26 y.o. female status post Spontaneous vaginal delivery on 17 09:13 AM  at Unknown. Patient is doing well today. She denies nausea, vomiting, fever or chills.  Patient reports mild abdominal pain that is well relieved by narcotic oral pain medications. Vaginal bleeding is light. Patient is voiding without difficulty and ambulating with no difficulty.    Patient is not breastfeeding. She desires circumcision for her male baby: yes.     Objective:     Vital Signs (Most Recent):  Temp: 98.5 °F (36.9 °C) (17 0800)  Pulse: 69  (17 0800)  Resp: 16 (17 0800)  BP: (!) 136/96 (17 0800)  SpO2: 100 % (17 1028) Vital Signs (24h Range):  Temp:  [97.6 °F (36.4 °C)-98.5 °F (36.9 °C)] 98.5 °F (36.9 °C)  Pulse:  [] 69  Resp:  [16-20] 16  SpO2:  [100 %] 100 %  BP: (122-152)/() 136/96     Weight: 69.9 kg (154 lb)  Body mass index is 25.63 kg/(m^2).      Intake/Output Summary (Last 24 hours) at 17 0948  Last data filed at 17 1600   Gross per 24 hour   Intake             1000 ml   Output              950 ml   Net               50 ml       Significant Labs:  Lab Results   Component Value Date    GROUPTRH O POS 2017       Recent Labs  Lab 17   HGB 9.7*   HCT 29.9*       I have personallly reviewed all pertinent lab results from the last 24 hours.    Physical Exam:   Constitutional: She is oriented to person, place, and time. She appears well-developed and well-nourished. No distress.    HENT:   Head: Normocephalic and atraumatic.    Eyes: Right eye exhibits no discharge. Left eye exhibits no discharge.    Neck: Normal range of motion. Neck supple. No tracheal deviation present. No thyromegaly present.     Pulmonary/Chest: Effort normal. No respiratory distress.        Abdominal:   Fundus firm     Genitourinary:   Genitourinary Comments: Lochia rubra light to moderate           Musculoskeletal: Normal range of motion and moves all extremeties. She exhibits no tenderness.       Neurological: She is alert and oriented to person, place, and time. She exhibits normal muscle tone. Coordination normal.    Skin: Skin is warm and dry. No rash noted. She is not diaphoretic. No erythema. No pallor.    Psychiatric: She has a normal mood and affect. Her behavior is normal. Judgment and thought content normal.       Assessment/Plan:     26 y.o. female  at Unknown for:    *  (normal spontaneous vaginal delivery)  Doing well PPD1    Single live birth  Doing well PPD1      Disposition: As patient  meets milestones, will plan to discharge tomorrow.    Laila Radford CNM  Obstetrics  [facility]

## 2017-05-05 NOTE — PLAN OF CARE
Problem: Patient Care Overview  Goal: Plan of Care Review  Outcome: Ongoing (interventions implemented as appropriate)  Plan of care reviewed with patient.  No acute distress noted.  Voiding adequately without difficulty. Small amount of lochia rubra without clots.  Encouraged ambulation. Good family support noted. Encouraged more interaction with infant.  Patient complied and bonded more with infant.  Significant other present.

## 2017-05-06 VITALS
DIASTOLIC BLOOD PRESSURE: 95 MMHG | RESPIRATION RATE: 18 BRPM | BODY MASS INDEX: 25.66 KG/M2 | HEART RATE: 84 BPM | WEIGHT: 154 LBS | TEMPERATURE: 99 F | OXYGEN SATURATION: 100 % | HEIGHT: 65 IN | SYSTOLIC BLOOD PRESSURE: 136 MMHG

## 2017-05-06 PROCEDURE — 25000003 PHARM REV CODE 250: Performed by: ADVANCED PRACTICE MIDWIFE

## 2017-05-06 RX ADMIN — OXYCODONE HYDROCHLORIDE AND ACETAMINOPHEN 1 TABLET: 5; 325 TABLET ORAL at 06:05

## 2017-05-06 RX ADMIN — DOCUSATE SODIUM 100 MG: 100 CAPSULE, LIQUID FILLED ORAL at 08:05

## 2017-05-06 NOTE — DISCHARGE INSTRUCTIONS
"Mother Self Care:    Activity: Avoid strenuous exercise and get adequate rest.  No driving until the physician consent given.  Emotional Changes: Most women find birth to be a time of great emotional upheaval.  Sense of loss, mood swings, fatigue, anxiety, and feeling "let down" are common.  If feelings worsen or last more than a week, call your physician.  Breast Care/Breastfeeding: Wear a bra for comfort.  Keep nipples dry and apply your own breast milk or lanolin cream as needed for soreness.  Engorgement can be relieved with warm, moist heat before feedings.  You may also take Ibuprofen.  Breast Care/Bottle Feeding: Wear support bra 24 hours a day for one week.  Avoid stimulation to breasts.  You may use ice packs for discomfort.  Juani-Care/Vaginal Bleeding: Remember to use your juani-bottle after urinating.  Your flow will change from red, to pink, to yellow/white color over a period of 2 weeks.  Menstruation will return in 3-8 weeks, or longer if breastfeeding.  Episiotomy Vaginal Delivery: Stitches will dissolve within 10 days to 3 weeks.  Warm baths, tucks, and dermoplast will promote healing.  Avoid bubble baths or strong soaps.   Section/Tubal Ligation: Keep incision clean and dry.  Please remove steri-strips in 5-7 days.  You may shower, but avoid baths.  Sexual Activity/Pelvic Rest: No sexual activity, tampons, or douching until your physician gives you consent.  Diet: Continue to eat from the five basic food groups, including plenty of protein, fruits, vegetables, and whole grains.  Limit empty calories and high fat foods.  Drink enough fluids to satisfy thirst and add an extra 500 calories for breastfeeding.  Constipation/Hemorrhoids: Drink plenty of water.  You may take a stool softener or natural laxative (Metamucil). You may use tucks or hemorrhoid ointment and soak in a warm tub.    CALL YOUR OB DOCTOR IF ANY OF THE FOLLOWING OCCURS:  *Heavy bleeding - saturating a pad an hour or passing any " large (2-3 inches in size) blood clots.  *Any pain, redness, or tenderness in lower leg.  *You cannot care for yourself or your baby.  *Any signs of infection-      - Temperature greater than 100.5 degrees F      - Foul smelling vaginal discharge and/or incisional drainage      - Increased episiotomy or incisional pain      - Hot, hard, red or sore area on breast      - Flu-like symptoms      - Any urgency, frequency or burning with urination

## 2017-05-06 NOTE — DISCHARGE SUMMARY
"[facility] -   Obstetrics  Discharge Summary      Patient Name: Georgette Eason  MRN: [id number]  Admission Date: 5/3/2017  Hospital Length of Stay: 3 days  Discharge Date and Time: 2017  Attending Physician: Sandhya Yepez MD   Discharging Provider: Pamela Ya CNM  Primary Care Provider: Ramona Fermin MD    HPI: Presented to window stated having cramping "all day long and I just passed a large clot".  This is  with stated EDC of 2017 per "US 2 wks ago".  PNC of 2 visits only in past 2 months.   [place]      * No surgery found *     Hospital Course:   Patient with no records on file.  Appears to be in discomfort and placed in triage for immediate assessment, VS, labs and evaluation.   Dr. Yepez called w/ results of FH of 39 cm, vtx presentation by Leopolds and VE, EFW 6 -7#,  VE 3.5 cm, Cat 2 tracing (no accels, but neg decels with contractions noted q 3-4), h/o substance use and blood clot visualized of 5 cm size (pt brought in).   Recommended admission and commit to delivery with initial expectant management, CFM and evaluation for cervical change or increased bleeding.   Routine ob with additional labs for no PNC, do UDS and hepatitis panel.       5/3/2017 - 9:15 pm. Records obtained from [facility].   Patient requested that her status be changed to organ donor.  States 's license states no organ donation, but has been wanting to change for 8 months and wants it known she would like to be an organ donor.     2017 - 1 am  Augmentation indicated.   17 8am AROM moderate meconium  17     Normal PP course. Social work and DCFS involved in case secondary to +UDS.  5/6 Discharge orders placed. Defer to pediatrician, MSW and DCFS for infant discharge plans.    Physical Exam:  General:    alert, appears stated age and cooperative   Abdomen:  normal findings: soft, non-tender and symmetric   Uterine Size:  firm located at the umbilicus. "   Incision:  n/a   Extremities:   no edema, redness or tenderness in the calves or thighs       Final Active Diagnoses:    Diagnosis Date Noted POA    PRINCIPAL PROBLEM:   (normal spontaneous vaginal delivery) [O80] 2017 Not Applicable    Single live birth [Z37.0] 2017 Not Applicable    High risk pregnancy due to maternal drug abuse, antepartum [O99.320, F19.90] 2017 Yes      Problems Resolved During this Admission:    Diagnosis Date Noted Date Resolved POA    Encounter for supervision of high risk pregnancy with insufficient prenatal care, antepartum [O09.30] 2017 Not Applicable    Vagina bleeding [N93.9] 2017 Yes        Labs: All labs within the past 24 hours have been reviewed    Feeding Method: bottle    Immunizations     Date Immunization Status Dose Route/Site Given by    17 1204 MMR Incomplete 0.5 mL Subcutaneous/Left deltoid     17 1204 Tdap Incomplete 0.5 mL Intramuscular/Left deltoid           Delivery:    Episiotomy: None   Lacerations: None   Repair suture:     Repair # of packets:     Blood loss (ml): 300     Birth information:  YOB: 2016   Time of birth: 9:13 AM   Sex: male   Delivery type: Vaginal, Spontaneous Delivery   Gestational Age: <None>    Delivery Clinician:      Other providers:       Additional  information:  Forceps:    Vacuum:    Breech:    Observed anomalies      Living?:           APGARS  One minute Five minutes Ten minutes   Skin color:         Heart rate:         Grimace:         Muscle tone:         Breathing:         Totals: 2  9        Placenta: Delivered:       appearance    Pending Diagnostic Studies:     None          Discharged Condition: stable    Disposition: Home or Self Care    Follow Up:  Follow-up Information     Follow up In 4 weeks.    Why:  PP visit        Patient Instructions:     Diet general     Activity as tolerated       Medications:  Current Discharge Medication List       CONTINUE these medications which have NOT CHANGED    Details   citalopram (CELEXA) 20 MG tablet Take 20 mg by mouth once daily.      diclofenac (VOLTAREN) 50 MG EC tablet Take 1 tablet (50 mg total) by mouth 3 (three) times daily as needed.  Qty: 14 tablet, Refills: 0         STOP taking these medications       naproxen (NAPROSYN) 375 MG tablet Comments:   Reason for Stopping:               Pamela Ya CNM  Obstetrics [facility]

## 2017-05-06 NOTE — PROGRESS NOTES
Pt d/c instructions given. All concerns and questions answered. Pt voiced understanding of d/c instructions.

## 2017-05-06 NOTE — PLAN OF CARE
Problem: Patient Care Overview  Goal: Plan of Care Review  Outcome: Ongoing (interventions implemented as appropriate)  The patient was alert and oriented. VSS. The patient's pain has been adequately controlled.Mother and infant bonding well. The patient has bed in low position, call light is within reach. Will continue to monitor the patient.

## 2017-05-18 ENCOUNTER — TELEPHONE (OUTPATIENT)
Dept: OBSTETRICS AND GYNECOLOGY | Facility: CLINIC | Age: 27
End: 2017-05-18

## 2017-05-18 NOTE — TELEPHONE ENCOUNTER
----- Message from Marley Fisher sent at 5/18/2017  1:42 PM CDT -----  Contact: PT  Pt states that she needs a release to go back to work...[phone]

## 2019-01-08 ENCOUNTER — HOSPITAL ENCOUNTER (EMERGENCY)
Facility: HOSPITAL | Age: 29
Discharge: LEFT BEFORE DISCHARGE | End: 2019-01-08
Attending: EMERGENCY MEDICINE
Payer: MEDICAID

## 2019-01-08 VITALS
DIASTOLIC BLOOD PRESSURE: 70 MMHG | WEIGHT: 129.63 LBS | TEMPERATURE: 97 F | HEIGHT: 65 IN | SYSTOLIC BLOOD PRESSURE: 144 MMHG | HEART RATE: 95 BPM | BODY MASS INDEX: 21.6 KG/M2 | RESPIRATION RATE: 18 BRPM | OXYGEN SATURATION: 100 %

## 2019-01-08 DIAGNOSIS — Z91.89 AT RISK FOR ELOPEMENT: ICD-10-CM

## 2019-01-08 DIAGNOSIS — R10.2 VAGINAL PAIN: Primary | ICD-10-CM

## 2019-01-08 DIAGNOSIS — S39.012A BACK STRAIN, INITIAL ENCOUNTER: ICD-10-CM

## 2019-01-08 PROCEDURE — 99281 EMR DPT VST MAYX REQ PHY/QHP: CPT
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
## 2019-01-09 NOTE — ED NOTES
Called patient for urine sample. No answer in lobby, treatment lounge, or results waiting room area

## 2019-05-29 ENCOUNTER — HOSPITAL ENCOUNTER (EMERGENCY)
Facility: HOSPITAL | Age: 29
Discharge: LEFT BEFORE DISCHARGE | End: 2019-05-30
Attending: EMERGENCY MEDICINE
Payer: MEDICAID

## 2019-05-29 VITALS
TEMPERATURE: 98 F | SYSTOLIC BLOOD PRESSURE: 137 MMHG | HEART RATE: 110 BPM | WEIGHT: 157.88 LBS | OXYGEN SATURATION: 100 % | BODY MASS INDEX: 26.3 KG/M2 | DIASTOLIC BLOOD PRESSURE: 78 MMHG | HEIGHT: 65 IN | RESPIRATION RATE: 18 BRPM

## 2019-05-29 DIAGNOSIS — Z53.29 LEFT AGAINST MEDICAL ADVICE: Primary | ICD-10-CM

## 2019-05-29 DIAGNOSIS — M54.42 ACUTE BACK PAIN WITH SCIATICA, LEFT: ICD-10-CM

## 2019-05-29 PROCEDURE — 99281 EMR DPT VST MAYX REQ PHY/QHP: CPT

## 2019-05-29 RX ORDER — ACETAMINOPHEN 500 MG
1000 TABLET ORAL
Status: DISCONTINUED | OUTPATIENT
Start: 2019-05-29 | End: 2019-05-30 | Stop reason: HOSPADM
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
## 2022-07-03 ENCOUNTER — HOSPITAL ENCOUNTER (EMERGENCY)
Facility: HOSPITAL | Age: 32
Discharge: LEFT BEFORE DISCHARGE | End: 2022-07-03
Attending: EMERGENCY MEDICINE
Payer: MEDICAID

## 2022-07-03 VITALS
DIASTOLIC BLOOD PRESSURE: 99 MMHG | BODY MASS INDEX: 23.6 KG/M2 | RESPIRATION RATE: 18 BRPM | TEMPERATURE: 98 F | HEART RATE: 97 BPM | HEIGHT: 63 IN | WEIGHT: 133.19 LBS | OXYGEN SATURATION: 98 % | SYSTOLIC BLOOD PRESSURE: 153 MMHG

## 2022-07-03 DIAGNOSIS — Z53.21 ELOPED FROM EMERGENCY DEPARTMENT: Primary | ICD-10-CM

## 2022-07-03 LAB
ALBUMIN SERPL BCP-MCNC: 4.2 G/DL (ref 3.5–5.2)
ALP SERPL-CCNC: 69 U/L (ref 55–135)
ALT SERPL W/O P-5'-P-CCNC: 11 U/L (ref 10–44)
ANION GAP SERPL CALC-SCNC: 13 MMOL/L (ref 8–16)
AST SERPL-CCNC: 19 U/L (ref 10–40)
B-HCG UR QL: NEGATIVE
BACTERIA #/AREA URNS HPF: ABNORMAL /HPF
BASOPHILS # BLD AUTO: 0.06 K/UL (ref 0–0.2)
BASOPHILS NFR BLD: 0.4 % (ref 0–1.9)
BILIRUB SERPL-MCNC: 0.5 MG/DL (ref 0.1–1)
BILIRUB UR QL STRIP: NEGATIVE
BUN SERPL-MCNC: 11 MG/DL (ref 6–20)
CALCIUM SERPL-MCNC: 9.8 MG/DL (ref 8.7–10.5)
CHLORIDE SERPL-SCNC: 103 MMOL/L (ref 95–110)
CLARITY UR: ABNORMAL
CO2 SERPL-SCNC: 22 MMOL/L (ref 23–29)
COLOR UR: YELLOW
CREAT SERPL-MCNC: 0.7 MG/DL (ref 0.5–1.4)
DIFFERENTIAL METHOD: ABNORMAL
EOSINOPHIL # BLD AUTO: 0.3 K/UL (ref 0–0.5)
EOSINOPHIL NFR BLD: 2 % (ref 0–8)
ERYTHROCYTE [DISTWIDTH] IN BLOOD BY AUTOMATED COUNT: 12 % (ref 11.5–14.5)
EST. GFR  (AFRICAN AMERICAN): >60 ML/MIN/1.73 M^2
EST. GFR  (NON AFRICAN AMERICAN): >60 ML/MIN/1.73 M^2
GLUCOSE SERPL-MCNC: 124 MG/DL (ref 70–110)
GLUCOSE UR QL STRIP: NEGATIVE
HCT VFR BLD AUTO: 41.5 % (ref 37–48.5)
HGB BLD-MCNC: 13.3 G/DL (ref 12–16)
HGB UR QL STRIP: NEGATIVE
HYALINE CASTS #/AREA URNS LPF: 3 /LPF
IMM GRANULOCYTES # BLD AUTO: 0.06 K/UL (ref 0–0.04)
IMM GRANULOCYTES NFR BLD AUTO: 0.4 % (ref 0–0.5)
KETONES UR QL STRIP: ABNORMAL
LEUKOCYTE ESTERASE UR QL STRIP: ABNORMAL
LIPASE SERPL-CCNC: 15 U/L (ref 4–60)
LYMPHOCYTES # BLD AUTO: 2.5 K/UL (ref 1–4.8)
LYMPHOCYTES NFR BLD: 15.8 % (ref 18–48)
MCH RBC QN AUTO: 28.8 PG (ref 27–31)
MCHC RBC AUTO-ENTMCNC: 32 G/DL (ref 32–36)
MCV RBC AUTO: 90 FL (ref 82–98)
MICROSCOPIC COMMENT: ABNORMAL
MONOCYTES # BLD AUTO: 1.3 K/UL (ref 0.3–1)
MONOCYTES NFR BLD: 8.4 % (ref 4–15)
NEUTROPHILS # BLD AUTO: 11.4 K/UL (ref 1.8–7.7)
NEUTROPHILS NFR BLD: 73 % (ref 38–73)
NITRITE UR QL STRIP: NEGATIVE
NRBC BLD-RTO: 0 /100 WBC
PH UR STRIP: 6 [PH] (ref 5–8)
PLATELET # BLD AUTO: 380 K/UL (ref 150–450)
PMV BLD AUTO: 9.6 FL (ref 9.2–12.9)
POTASSIUM SERPL-SCNC: 3.9 MMOL/L (ref 3.5–5.1)
PROT SERPL-MCNC: 7.8 G/DL (ref 6–8.4)
PROT UR QL STRIP: ABNORMAL
RBC # BLD AUTO: 4.62 M/UL (ref 4–5.4)
RBC #/AREA URNS HPF: 3 /HPF (ref 0–4)
SODIUM SERPL-SCNC: 138 MMOL/L (ref 136–145)
SP GR UR STRIP: 1.02 (ref 1–1.03)
SQUAMOUS #/AREA URNS HPF: 53 /HPF
UNIDENT CRYS URNS QL MICRO: ABNORMAL
URN SPEC COLLECT METH UR: ABNORMAL
UROBILINOGEN UR STRIP-ACNC: NEGATIVE EU/DL
WBC # BLD AUTO: 15.63 K/UL (ref 3.9–12.7)
WBC #/AREA URNS HPF: 16 /HPF (ref 0–5)

## 2022-07-03 PROCEDURE — 81000 URINALYSIS NONAUTO W/SCOPE: CPT | Performed by: PHYSICIAN ASSISTANT

## 2022-07-03 PROCEDURE — 83690 ASSAY OF LIPASE: CPT | Performed by: PHYSICIAN ASSISTANT

## 2022-07-03 PROCEDURE — 80053 COMPREHEN METABOLIC PANEL: CPT | Performed by: PHYSICIAN ASSISTANT

## 2022-07-03 PROCEDURE — 99283 EMERGENCY DEPT VISIT LOW MDM: CPT

## 2022-07-03 PROCEDURE — 81025 URINE PREGNANCY TEST: CPT | Performed by: PHYSICIAN ASSISTANT

## 2022-07-03 PROCEDURE — 85025 COMPLETE CBC W/AUTO DIFF WBC: CPT | Performed by: PHYSICIAN ASSISTANT

## 2022-07-03 PROCEDURE — 87086 URINE CULTURE/COLONY COUNT: CPT | Performed by: PHYSICIAN ASSISTANT

## 2022-07-03 NOTE — FIRST PROVIDER EVALUATION
Medical screening exam completed.  I have conducted a focused provider triage encounter, findings are as follows:    Brief history of present illness:  Left flank pain for a week.  Loose stool for 1 day    There were no vitals filed for this visit.    Pertinent physical exam:  Nad, alert        Preliminary workup initiated; this workup will be continued and followed by the physician or advanced practice provider that is assigned to the patient when roomed.
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
## 2022-07-05 LAB
BACTERIA UR CULT: NORMAL
BACTERIA UR CULT: NORMAL

## 2023-01-24 NOTE — ASSESSMENT & PLAN NOTE
Patient's (Body mass index is 59.14 kg/m².) indicates that they are obese (BMI >30) with health conditions that include obstructive sleep apnea and osteoarthritis . Weight is newly identified. BMI is is above average; BMI management plan is completed. We discussed low calorie, low carb based diet program, portion control, increasing exercise and an diana-based approach such as MyFitness Pal or Lose It.   -- This is an initial visit and patient was referred to us from her primary care provider  --Body comp analysis was reviewed in office today.  [employer] diana was set up based on basal metabolic rate.  Patient advised that #1 goal is to start food looking in particular at calories, protein, carbs.  We are looking to keep our protein elevated and her carbs lowered.  Advised that were not looking for perfection on this but just starting to analyze what type of macros her food is having.  -- Currently drinking around 4 sweet drinks including sodas and monster energy drinks daily.  Goal to reduce this to no more than 1 a day with eventually less than that.  -- Goal to be thinking of ways that she can start her day with a protein-based breakfast versus adding a higher sugar breakfast such as miniature donuts.  -- Patient is interested in pharmacology.  Medication eligibility sheet provided and asked her to check with her insurance for coverage options and bring into next office visit.  We will discuss further after receiving labs at next office visit in 2 weeks.   UDS pending   normal...